# Patient Record
Sex: FEMALE | Race: WHITE | NOT HISPANIC OR LATINO | URBAN - METROPOLITAN AREA
[De-identification: names, ages, dates, MRNs, and addresses within clinical notes are randomized per-mention and may not be internally consistent; named-entity substitution may affect disease eponyms.]

---

## 2017-09-25 ENCOUNTER — AMBULATORY SURGICAL CENTER (OUTPATIENT)
Dept: URBAN - METROPOLITAN AREA SURGERY 1 | Facility: SURGERY | Age: 52
End: 2017-09-25
Payer: COMMERCIAL

## 2017-09-25 VITALS
HEART RATE: 55 BPM | TEMPERATURE: 98.2 F | SYSTOLIC BLOOD PRESSURE: 113 MMHG | TEMPERATURE: 97.9 F | DIASTOLIC BLOOD PRESSURE: 71 MMHG | SYSTOLIC BLOOD PRESSURE: 148 MMHG | SYSTOLIC BLOOD PRESSURE: 100 MMHG | HEART RATE: 55 BPM | RESPIRATION RATE: 16 BRPM | HEART RATE: 50 BPM | HEART RATE: 57 BPM | WEIGHT: 163.8 LBS | WEIGHT: 163.8 LBS | TEMPERATURE: 97.9 F | OXYGEN SATURATION: 100 % | DIASTOLIC BLOOD PRESSURE: 50 MMHG | HEART RATE: 57 BPM | HEART RATE: 62 BPM | HEART RATE: 51 BPM | SYSTOLIC BLOOD PRESSURE: 99 MMHG | HEART RATE: 62 BPM | OXYGEN SATURATION: 100 % | DIASTOLIC BLOOD PRESSURE: 40 MMHG | SYSTOLIC BLOOD PRESSURE: 99 MMHG | SYSTOLIC BLOOD PRESSURE: 100 MMHG | SYSTOLIC BLOOD PRESSURE: 148 MMHG | HEIGHT: 63 IN | HEART RATE: 54 BPM | SYSTOLIC BLOOD PRESSURE: 101 MMHG | OXYGEN SATURATION: 99 % | HEART RATE: 54 BPM | HEART RATE: 50 BPM | DIASTOLIC BLOOD PRESSURE: 74 MMHG | DIASTOLIC BLOOD PRESSURE: 40 MMHG | OXYGEN SATURATION: 99 % | RESPIRATION RATE: 16 BRPM | HEIGHT: 63 IN | TEMPERATURE: 98.2 F | DIASTOLIC BLOOD PRESSURE: 71 MMHG | DIASTOLIC BLOOD PRESSURE: 50 MMHG | SYSTOLIC BLOOD PRESSURE: 101 MMHG | DIASTOLIC BLOOD PRESSURE: 48 MMHG | DIASTOLIC BLOOD PRESSURE: 74 MMHG | HEART RATE: 51 BPM | SYSTOLIC BLOOD PRESSURE: 113 MMHG | DIASTOLIC BLOOD PRESSURE: 48 MMHG

## 2017-09-25 DIAGNOSIS — Z12.11 ENCOUNTER FOR SCREENING FOR MALIGNANT NEOPLASM OF COLON: ICD-10-CM

## 2017-09-25 DIAGNOSIS — K64.8 OTHER HEMORRHOIDS: ICD-10-CM

## 2019-06-25 ENCOUNTER — OFFICE (OUTPATIENT)
Dept: URBAN - METROPOLITAN AREA CLINIC 10 | Facility: CLINIC | Age: 54
End: 2019-06-25

## 2019-06-25 VITALS
WEIGHT: 182 LBS | SYSTOLIC BLOOD PRESSURE: 112 MMHG | HEIGHT: 63 IN | DIASTOLIC BLOOD PRESSURE: 72 MMHG | HEART RATE: 62 BPM

## 2019-06-25 DIAGNOSIS — K85.90 ACUTE PANCREATITIS WITHOUT NECROSIS OR INFECTION, UNSPECIFIE: ICD-10-CM

## 2019-06-25 LAB
C-REACTIVE PROTEIN, QUANT: 18 MG/L — HIGH (ref 0–10)
IGG, SUBCLASSES(1-4): IGG, SUBCLASS 1: 383 MG/DL (ref 248–810)
IGG, SUBCLASSES(1-4): IGG, SUBCLASS 2: 354 MG/DL (ref 130–555)
IGG, SUBCLASSES(1-4): IGG, SUBCLASS 3: 69 MG/DL (ref 15–102)
IGG, SUBCLASSES(1-4): IGG, SUBCLASS 4: 20 MG/DL (ref 2–96)
IGG, SUBCLASSES(1-4): IMMUNOGLOBULIN G, QN, SERUM: 839 MG/DL (ref 700–1600)
LIPASE: 71 U/L (ref 14–72)

## 2019-06-25 PROCEDURE — 99214 OFFICE O/P EST MOD 30 MIN: CPT | Performed by: INTERNAL MEDICINE

## 2019-07-08 ENCOUNTER — OFFICE (OUTPATIENT)
Dept: URBAN - METROPOLITAN AREA CLINIC 10 | Facility: CLINIC | Age: 54
End: 2019-07-08

## 2019-08-28 ENCOUNTER — AMBULATORY SURGICAL CENTER (OUTPATIENT)
Dept: URBAN - METROPOLITAN AREA SURGERY 1 | Facility: SURGERY | Age: 54
End: 2019-08-28
Payer: COMMERCIAL

## 2019-08-28 VITALS
HEIGHT: 63 IN | TEMPERATURE: 98.1 F | HEART RATE: 50 BPM | SYSTOLIC BLOOD PRESSURE: 97 MMHG | HEART RATE: 56 BPM | HEART RATE: 55 BPM | TEMPERATURE: 97.8 F | DIASTOLIC BLOOD PRESSURE: 52 MMHG | OXYGEN SATURATION: 99 % | HEART RATE: 58 BPM | OXYGEN SATURATION: 97 % | HEART RATE: 58 BPM | SYSTOLIC BLOOD PRESSURE: 93 MMHG | HEART RATE: 52 BPM | DIASTOLIC BLOOD PRESSURE: 40 MMHG | OXYGEN SATURATION: 94 % | HEART RATE: 56 BPM | DIASTOLIC BLOOD PRESSURE: 56 MMHG | HEART RATE: 52 BPM | OXYGEN SATURATION: 94 % | SYSTOLIC BLOOD PRESSURE: 114 MMHG | DIASTOLIC BLOOD PRESSURE: 52 MMHG | SYSTOLIC BLOOD PRESSURE: 137 MMHG | DIASTOLIC BLOOD PRESSURE: 61 MMHG | SYSTOLIC BLOOD PRESSURE: 114 MMHG | DIASTOLIC BLOOD PRESSURE: 56 MMHG | OXYGEN SATURATION: 96 % | RESPIRATION RATE: 16 BRPM | OXYGEN SATURATION: 97 % | SYSTOLIC BLOOD PRESSURE: 85 MMHG | SYSTOLIC BLOOD PRESSURE: 93 MMHG | SYSTOLIC BLOOD PRESSURE: 108 MMHG | RESPIRATION RATE: 15 BRPM | OXYGEN SATURATION: 94 % | HEART RATE: 56 BPM | WEIGHT: 179 LBS | DIASTOLIC BLOOD PRESSURE: 71 MMHG | WEIGHT: 179 LBS | SYSTOLIC BLOOD PRESSURE: 85 MMHG | HEART RATE: 52 BPM | DIASTOLIC BLOOD PRESSURE: 57 MMHG | DIASTOLIC BLOOD PRESSURE: 61 MMHG | SYSTOLIC BLOOD PRESSURE: 93 MMHG | DIASTOLIC BLOOD PRESSURE: 71 MMHG | HEIGHT: 63 IN | RESPIRATION RATE: 15 BRPM | RESPIRATION RATE: 15 BRPM | DIASTOLIC BLOOD PRESSURE: 57 MMHG | OXYGEN SATURATION: 96 % | OXYGEN SATURATION: 99 % | OXYGEN SATURATION: 99 % | TEMPERATURE: 97.8 F | DIASTOLIC BLOOD PRESSURE: 40 MMHG | TEMPERATURE: 97.8 F | SYSTOLIC BLOOD PRESSURE: 108 MMHG | RESPIRATION RATE: 16 BRPM | OXYGEN SATURATION: 97 % | SYSTOLIC BLOOD PRESSURE: 137 MMHG | HEART RATE: 55 BPM | DIASTOLIC BLOOD PRESSURE: 71 MMHG | HEART RATE: 50 BPM | HEART RATE: 58 BPM | SYSTOLIC BLOOD PRESSURE: 108 MMHG | SYSTOLIC BLOOD PRESSURE: 97 MMHG | SYSTOLIC BLOOD PRESSURE: 85 MMHG | SYSTOLIC BLOOD PRESSURE: 97 MMHG | TEMPERATURE: 98.1 F | RESPIRATION RATE: 16 BRPM | DIASTOLIC BLOOD PRESSURE: 52 MMHG | HEART RATE: 50 BPM | OXYGEN SATURATION: 96 % | DIASTOLIC BLOOD PRESSURE: 61 MMHG | WEIGHT: 179 LBS | DIASTOLIC BLOOD PRESSURE: 40 MMHG | SYSTOLIC BLOOD PRESSURE: 114 MMHG | TEMPERATURE: 98.1 F | HEIGHT: 63 IN | HEART RATE: 55 BPM | SYSTOLIC BLOOD PRESSURE: 137 MMHG | DIASTOLIC BLOOD PRESSURE: 56 MMHG | DIASTOLIC BLOOD PRESSURE: 57 MMHG

## 2019-08-28 DIAGNOSIS — R10.13 EPIGASTRIC PAIN: ICD-10-CM

## 2019-08-28 PROCEDURE — 43235 EGD DIAGNOSTIC BRUSH WASH: CPT | Performed by: INTERNAL MEDICINE

## 2024-01-29 ENCOUNTER — OFFICE (OUTPATIENT)
Dept: URBAN - METROPOLITAN AREA CLINIC 10 | Facility: CLINIC | Age: 59
End: 2024-01-29

## 2024-01-29 VITALS
SYSTOLIC BLOOD PRESSURE: 141 MMHG | WEIGHT: 204 LBS | DIASTOLIC BLOOD PRESSURE: 61 MMHG | HEIGHT: 63 IN | OXYGEN SATURATION: 99 % | HEART RATE: 57 BPM

## 2024-01-29 DIAGNOSIS — R13.10 DYSPHAGIA, UNSPECIFIED: ICD-10-CM

## 2024-01-29 DIAGNOSIS — R74.01 ELEVATION OF LEVELS OF LIVER TRANSAMINASE LEVELS: ICD-10-CM

## 2024-01-29 PROCEDURE — 99204 OFFICE O/P NEW MOD 45 MIN: CPT | Performed by: INTERNAL MEDICINE

## 2024-02-01 LAB
ACTIN (SMOOTH MUSCLE) ANTIBODY: 5 UNITS (ref 0–19)
ANA BY IFA RFX TITER/PATTERN: NEGATIVE
HBSAG SCREEN: NEGATIVE
HCV ANTIBODY: HEP C VIRUS AB: NON REACTIVE
HEPATIC FUNCTION PANEL (7): ALBUMIN: 4.5 G/DL (ref 3.8–4.9)
HEPATIC FUNCTION PANEL (7): ALKALINE PHOSPHATASE: 83 IU/L (ref 44–121)
HEPATIC FUNCTION PANEL (7): ALT (SGPT): 45 IU/L — HIGH (ref 0–32)
HEPATIC FUNCTION PANEL (7): AST (SGOT): 37 IU/L (ref 0–40)
HEPATIC FUNCTION PANEL (7): BILIRUBIN, DIRECT: <0.1 MG/DL
HEPATIC FUNCTION PANEL (7): BILIRUBIN, TOTAL: <0.2 MG/DL
HEPATIC FUNCTION PANEL (7): PROTEIN, TOTAL: 6.8 G/DL (ref 6–8.5)
IMMUNOGLOBULIN A, QN, SERUM: 186 MG/DL (ref 87–352)
IMMUNOGLOBULIN G, QN, SERUM: 743 MG/DL (ref 586–1602)
MITOCHONDRIAL (M2) ANTIBODY: <20 UNITS
T-TRANSGLUTAMINASE (TTG) IGA: <2 U/ML

## 2024-02-05 ENCOUNTER — OFFICE (OUTPATIENT)
Dept: URBAN - METROPOLITAN AREA CLINIC 11 | Facility: CLINIC | Age: 59
End: 2024-02-05
Payer: COMMERCIAL

## 2024-02-05 VITALS — HEIGHT: 63 IN

## 2024-02-05 DIAGNOSIS — K76.0 FATTY (CHANGE OF) LIVER, NOT ELSEWHERE CLASSIFIED: ICD-10-CM

## 2024-02-05 PROCEDURE — 76981 USE PARENCHYMA: CPT | Performed by: INTERNAL MEDICINE

## 2024-03-18 ENCOUNTER — OFFICE (OUTPATIENT)
Dept: URBAN - METROPOLITAN AREA CLINIC 10 | Facility: CLINIC | Age: 59
End: 2024-03-18

## 2024-03-18 VITALS
HEIGHT: 63 IN | OXYGEN SATURATION: 96 % | SYSTOLIC BLOOD PRESSURE: 186 MMHG | DIASTOLIC BLOOD PRESSURE: 82 MMHG | HEART RATE: 52 BPM | WEIGHT: 204 LBS

## 2024-03-18 DIAGNOSIS — K21.9 GASTRO-ESOPHAGEAL REFLUX DISEASE WITHOUT ESOPHAGITIS: ICD-10-CM

## 2024-03-18 DIAGNOSIS — K76.0 FATTY (CHANGE OF) LIVER, NOT ELSEWHERE CLASSIFIED: ICD-10-CM

## 2024-03-18 PROCEDURE — 99213 OFFICE O/P EST LOW 20 MIN: CPT | Performed by: INTERNAL MEDICINE

## 2024-05-11 ENCOUNTER — HOSPITAL ENCOUNTER (OUTPATIENT)
Dept: RADIOLOGY | Facility: HOSPITAL | Age: 59
Discharge: HOME | End: 2024-05-11
Payer: COMMERCIAL

## 2024-05-11 ENCOUNTER — OFFICE VISIT (OUTPATIENT)
Dept: URGENT CARE | Facility: CLINIC | Age: 59
End: 2024-05-11
Payer: COMMERCIAL

## 2024-05-11 ENCOUNTER — HOSPITAL ENCOUNTER (OUTPATIENT)
Dept: RADIOLOGY | Facility: HOSPITAL | Age: 59
End: 2024-05-11
Payer: COMMERCIAL

## 2024-05-11 VITALS
SYSTOLIC BLOOD PRESSURE: 132 MMHG | DIASTOLIC BLOOD PRESSURE: 78 MMHG | OXYGEN SATURATION: 97 % | TEMPERATURE: 98.3 F | RESPIRATION RATE: 16 BRPM | HEART RATE: 65 BPM | WEIGHT: 201 LBS

## 2024-05-11 DIAGNOSIS — S62.161A: Primary | ICD-10-CM

## 2024-05-11 PROCEDURE — 73130 X-RAY EXAM OF HAND: CPT | Mod: RT

## 2024-05-11 PROCEDURE — 29125 APPL SHORT ARM SPLINT STATIC: CPT | Mod: RT | Performed by: NURSE PRACTITIONER

## 2024-05-11 PROCEDURE — 99203 OFFICE O/P NEW LOW 30 MIN: CPT | Mod: 25 | Performed by: NURSE PRACTITIONER

## 2024-05-11 PROCEDURE — 73110 X-RAY EXAM OF WRIST: CPT | Mod: RT

## 2024-05-11 PROCEDURE — 73090 X-RAY EXAM OF FOREARM: CPT | Mod: RT

## 2024-05-11 RX ORDER — PNV NO.95/FERROUS FUM/FOLIC AC 28MG-0.8MG
500 TABLET ORAL DAILY
COMMUNITY

## 2024-05-11 RX ORDER — FLUTICASONE FUROATE AND VILANTEROL 100; 25 UG/1; UG/1
1 POWDER RESPIRATORY (INHALATION) DAILY
COMMUNITY

## 2024-05-11 RX ORDER — HYDROCODONE BITARTRATE AND ACETAMINOPHEN 5; 325 MG/1; MG/1
1 TABLET ORAL EVERY 6 HOURS PRN
Qty: 15 TABLET | Refills: 0 | Status: SHIPPED | OUTPATIENT
Start: 2024-05-11 | End: 2024-05-26

## 2024-05-11 RX ORDER — ESTERIFIED ESTROGEN AND METHYLTESTOSTERONE 1.25; 2.5 MG/1; MG/1
1 TABLET ORAL DAILY
COMMUNITY

## 2024-05-11 RX ORDER — NABUMETONE 500 MG/1
500 TABLET, FILM COATED ORAL 2 TIMES DAILY
COMMUNITY

## 2024-05-11 RX ORDER — TRIAMTERENE/HYDROCHLOROTHIAZID 37.5-25 MG
1 TABLET ORAL DAILY
COMMUNITY

## 2024-05-11 RX ORDER — ESOMEPRAZOLE MAGNESIUM 40 MG/1
40 CAPSULE, DELAYED RELEASE ORAL DAILY
COMMUNITY

## 2024-05-11 RX ORDER — ALBUTEROL SULFATE 90 UG/1
2 INHALANT RESPIRATORY (INHALATION) EVERY 4 HOURS PRN
COMMUNITY

## 2024-05-11 RX ORDER — ROSUVASTATIN CALCIUM 10 MG/1
10 TABLET, COATED ORAL DAILY
COMMUNITY

## 2024-05-12 NOTE — PATIENT INSTRUCTIONS
X-ray forearm 2 views right   Final Result   IMPRESSION:   No acute osseous abnormality.      X-ray hand 3 or more views right   Final Result   IMPRESSION:   Acute, minimally displaced fracture of the pisiform.      X-ray wrist 3 or more views right   Final Result   IMPRESSION:   Acute, minimally displaced fracture of the pisiform.        You were evaluated for right wrist pain and found to have a fracture of your 'pisiform' bone. You were place in a splint to wear at all time until follow up when you return home. Rest and elevate affected extremity. Ice 15 minutes three times a day. In addition, you are advised to alternate tylenol (500-1000mg) 3-4 times a day with ibuprofen (400-600mg) 2-3 times a day with food if needed for fever or discomfort. Utilize Norco for break through pain. This medication commonly causes constipation; take 1 colace with each norco.

## 2024-05-12 NOTE — PROGRESS NOTES
Subjective      Chief Complaint   Patient presents with    Wrist Pain     Right wrist pain after tripping on the sidewalk this morning.     History of Present Illness    The patient, with a history of rheumatoid arthritis, presents with right hand pain after a ground level fall earlier in the day. She was walking on a sidewalk when her  accidentally stepped on her shoe, causing her to fall forward onto the concrete. She landed on her outstretched hands and also scraped her knees. The patient was unable to get up from the sidewalk due to the pain in her hands. Washed all abrasions with soapy water.    The right hand has been particularly bothersome since the incident, with the pain described as a constant ache, sometimes sharp, and at times feels like a pulsating sensation. The pain is severe, rated at least a 7 out of 10, and is exacerbated by certain movements and positions. The patient has not noticed any numbness, tingling, or loss of sensation in the hand. Has noted development of bruising across her wrist. Attempted tylenol and immobilization which did help some.     The patient also reports a sore thumb on the left hand, but states that the pain is not severe like the right hand. She has good range of motion in the left wrist and hand, but experiences pain mild when moving the thumb. She has appreciated some bruising near the thumb.     She does not report any significant pain or decreased ROM in the knees.       History reviewed. No pertinent past medical history.    Current Outpatient Medications:     albuterol HFA 90 mcg/actuation inhaler, Inhale 2 puffs every 4 (four) hours as needed, Disp: , Rfl:     esomeprazole (NexIUM) 40 mg capsule, Take 1 capsule (40 mg total) by mouth daily, Disp: , Rfl:     estrogens-methyltestosterone (EEMT,COVARYX) 1.25-2.5 mg per tablet, Take 1 tablet by mouth daily, Disp: , Rfl:     nabumetone (RELAFEN) 500 mg tablet, Take 1 tablet (500 mg total) by mouth 2 (two) times a  day, Disp: , Rfl:     triamterene-hydrochlorothiazid (MAXZIDE-25) 37.5-25 mg per tablet, Take 1 tablet by mouth daily, Disp: , Rfl:     cyanocobalamin 100 mcg tablet, Take 5 tablets (500 mcg total) by mouth daily, Disp: , Rfl:     rosuvastatin (CRESTOR) 10 mg tablet, Take 1 tablet (10 mg total) by mouth daily, Disp: , Rfl:     fluticasone furoate-vilanteroL (BREO ELLIPTA) 100-25 mcg/dose blister with device inhaler, Inhale 1 puff daily, Disp: , Rfl:     HYDROcodone-acetaminophen (NORCO) 5-325 mg per tablet, Take 1 tablet by mouth every 6 (six) hours as needed for pain scale 8-10/10 for up to 15 days Max Daily Amount: 4 tablets, Disp: 15 tablet, Rfl: 0  No Known Allergies  History reviewed. No pertinent surgical history.  The following have been reviewed and updated as appropriate in this visit:   Allergies  Meds  Problems  Med Hx  Surg Hx  Fam Hx       Review of Systems  As noted in HPI.  Objective   /78 (BP Location: Left arm, Patient Position: Sitting, Cuff Size: Long Adult)   Pulse 65   Temp 36.8 °C (98.3 °F)   Resp 16   Wt 91.2 kg (201 lb)   SpO2 97%     Physical Exam  Vitals and nursing note reviewed.   Constitutional:       Appearance: Normal appearance.   Eyes:      Extraocular Movements: Extraocular movements intact.      Conjunctiva/sclera: Conjunctivae normal.      Pupils: Pupils are equal, round, and reactive to light.   Cardiovascular:      Rate and Rhythm: Normal rate and regular rhythm.      Pulses: Normal pulses.      Heart sounds: Normal heart sounds.   Pulmonary:      Effort: Pulmonary effort is normal.      Breath sounds: Normal breath sounds.   Musculoskeletal:      Right forearm: Swelling and tenderness (Palpation of proximal forearm causes radiating pain distally.) present.      Right wrist: Swelling and bony tenderness present. Decreased range of motion. Normal pulse.      Right hand: Swelling and bony tenderness (First metacarpal and phalanx) present. Normal range of motion.  Normal strength. Normal sensation. There is no disruption of two-point discrimination. Normal capillary refill. Normal pulse.      Comments: Pain aggravated by flexion, extension and supination/pronation of right hand.  Pain most significant to distal radius.    Skin:     General: Skin is warm.      Capillary Refill: Capillary refill takes less than 2 seconds.      Findings: Abrasion (Right proximal forearm, 5th metacarpal and phalanx) and ecchymosis (wrist) present.   Neurological:      Mental Status: She is alert.       X-ray forearm 2 views right   Final Result   IMPRESSION:   No acute osseous abnormality.      X-ray hand 3 or more views right   Final Result   IMPRESSION:   Acute, minimally displaced fracture of the pisiform.      X-ray wrist 3 or more views right   Final Result   IMPRESSION:   Acute, minimally displaced fracture of the pisiform.      Cast/Splint Application    Date/Time: 5/11/2024 8:51 AM    Performed by: Tamara Plasencia CNP    Consent    Verbal consent was obtained from the patient. Written consent was not obtained from the patient. Risks, benefits, and alternatives were discussed. Consent given by patient. The patient states understanding of the procedure being performed. Patient ID confirmed verbally with the patient.       Wound 1 Procedure Details  Body area: arm  Site: R lower arm    Pre-Procedure  Neuro-vascular status was normal. Skin was normal.     Procedure Details   Sugar tong arm splint applied to R lower arm in standard fashion. Supplies used were stockingette, cotton padding, fiberglass splinting and elastic bandage.     Post-Procedure  Pain improved after procedure. Neurovascular status was normal.Skin was normal. Patient tolerated the procedure well with no complications.           Assessment/Plan   Diagnoses and all orders for this visit:    Displaced fracture of pisiform, right wrist, initial encounter for closed fracture  -     X-ray wrist 3 or more views right  -      X-ray hand 3 or more views right  -     X-ray forearm 2 views right  -     HYDROcodone-acetaminophen (NORCO) 5-325 mg per tablet; Take 1 tablet by mouth every 6 (six) hours as needed for pain scale 8-10/10 for up to 15 days Max Daily Amount: 4 tablets    Assessment/Plan     Right Wrist Pain: Fall onto outstretched hand with persistent pain, particularly with movement. No numbness, tingling, or loss of sensation. Radiating pulse-like pain described as 7/10 in severity.  -Given extent of discomfort palpated across right upper extremity from hand into forearm, obtained x-rays of right hand, wrist and forearm.  Noted on x-rays of hand and wrist was an acute, minimally displaced fracture of the pisiform.  Attempted to apply a TKO brace but patient did not tolerate it, felt that pain was worse when on.  TKO brace was removed.  Due to such discomfort upon supination and pronation, and she was placed in a sugar-tong splint, please see details above and proc doc.   -Advised to alternate tylenol (500-1000mg) 3-4 times a day with ibuprofen (400-600mg) 2-3 times a day with food if needed for fever or discomfort.   -Prescribed Norco for breakthrough pain.  Reviewed all medication uses, doses and side effects including possibility of constipation related to Norco.  If she does develop constipation, may utilize Colace as needed.  -Advised on rest, elevation and application of ice 15 minutes 3 times daily.  -Patient is from [place] and planning to return home on Monday.  She was instructed to call her PCP or an Ortho provider on Monday to schedule follow-up appointment within the next week.    Left thumb pain: Mild pain and bruising after fall, but good range of motion and less severe than right wrist.  -Monitor and manage pain as needed.  -RICE protocol    Knee Abrasions: Minor abrasions from fall, no broken skin.  -Clean and monitor for signs of infection.     Discussed signs and symptoms of when to return sooner. Patient  voices understanding and agrees with plan.     Tamara Plasencia, CNP    A voice recognition program was used to aid in documentation of this record.  Sometimes words are not printed exactly as they were spoken.  While efforts were made to carefully edit and correct any inaccuracies, some areas may be present; please take these into context.  Please contact the provider if areas are identified.